# Patient Record
Sex: FEMALE | Race: BLACK OR AFRICAN AMERICAN
[De-identification: names, ages, dates, MRNs, and addresses within clinical notes are randomized per-mention and may not be internally consistent; named-entity substitution may affect disease eponyms.]

---

## 2019-03-28 ENCOUNTER — HOSPITAL ENCOUNTER (EMERGENCY)
Dept: HOSPITAL 92 - ERS | Age: 24
Discharge: HOME | End: 2019-03-28
Payer: SELF-PAY

## 2019-03-28 DIAGNOSIS — O99.341: ICD-10-CM

## 2019-03-28 DIAGNOSIS — Z3A.01: ICD-10-CM

## 2019-03-28 DIAGNOSIS — O99.011: ICD-10-CM

## 2019-03-28 DIAGNOSIS — O20.0: Primary | ICD-10-CM

## 2019-03-28 LAB
BACTERIA UR QL AUTO: (no result) HPF
BASOPHILS # BLD AUTO: 0.1 THOU/UL (ref 0–0.2)
BASOPHILS NFR BLD AUTO: 0.8 % (ref 0–1)
CRYSTAL-AUWI FLAG: 0.5 (ref 0–15)
EOSINOPHIL # BLD AUTO: 0.1 THOU/UL (ref 0–0.7)
EOSINOPHIL NFR BLD AUTO: 0.6 % (ref 0–10)
HCG SERPL QL: POSITIVE
HEV IGM SER QL: 1.3 (ref 0–7.99)
HGB BLD-MCNC: 13.7 G/DL (ref 12–16)
HYALINE CASTS #/AREA URNS LPF: (no result) LPF
LYMPHOCYTES # BLD: 2.2 THOU/UL (ref 1.2–3.4)
LYMPHOCYTES NFR BLD AUTO: 21.8 % (ref 21–51)
MCH RBC QN AUTO: 31.7 PG (ref 27–31)
MCV RBC AUTO: 93.3 FL (ref 78–98)
MONOCYTES # BLD AUTO: 0.7 THOU/UL (ref 0.11–0.59)
MONOCYTES NFR BLD AUTO: 6.8 % (ref 0–10)
NEUTROPHILS # BLD AUTO: 7 THOU/UL (ref 1.4–6.5)
NEUTROPHILS NFR BLD AUTO: 70 % (ref 42–75)
PATHC CAST-AUWI FLAG: 0.54 (ref 0–2.49)
PLATELET # BLD AUTO: 247 THOU/UL (ref 130–400)
PREGS CONTROL BACKGROUND?: (no result)
PREGS CONTROL BAR APPEAR?: YES
RBC # BLD AUTO: 4.31 MILL/UL (ref 4.2–5.4)
SP GR UR STRIP: 1.02 (ref 1–1.04)
SPERM-AUWI FLAG: 0 (ref 0–9.9)
WBC # BLD AUTO: 10 THOU/UL (ref 4.8–10.8)
WBC UR QL AUTO: (no result) HPF (ref 0–3)
YEAST-AUWI FLAG: 0 (ref 0–25)

## 2019-03-28 PROCEDURE — 81015 MICROSCOPIC EXAM OF URINE: CPT

## 2019-03-28 PROCEDURE — 85025 COMPLETE CBC W/AUTO DIFF WBC: CPT

## 2019-03-28 PROCEDURE — 36415 COLL VENOUS BLD VENIPUNCTURE: CPT

## 2019-03-28 PROCEDURE — 84702 CHORIONIC GONADOTROPIN TEST: CPT

## 2019-03-28 PROCEDURE — 76856 US EXAM PELVIC COMPLETE: CPT

## 2019-03-28 PROCEDURE — 84703 CHORIONIC GONADOTROPIN ASSAY: CPT

## 2019-03-28 PROCEDURE — 86901 BLOOD TYPING SEROLOGIC RH(D): CPT

## 2019-03-28 PROCEDURE — 86900 BLOOD TYPING SEROLOGIC ABO: CPT

## 2019-03-28 PROCEDURE — 87086 URINE CULTURE/COLONY COUNT: CPT

## 2019-03-28 PROCEDURE — 81003 URINALYSIS AUTO W/O SCOPE: CPT

## 2019-03-28 NOTE — ULT
PELVIC ULTRASOUND INCLUDING TRANSABDOMINAL AND TRANSVAGINAL AND VASCULAR DUPLEX WITH COLOR AND SPECTR
AL DOPPLER IMAGING:

 

Date;  03/28/19 

 

HISTORY:  

Back pain. Positive pregnancy test. 

 

FINDINGS:

The uterus measures 9.0 x 4.6 x 5.0 cm. The right ovary measures 2.8 x 3.9 x 4.0 cm. The left ovary i
s not visualized. 1.0 x 1.5 x 1.6 cm diameter right ovarian cyst. Intrauterine gestational sac measur
es approximately 1.2 cm, equivalent to 6 weeks/0 days gestation. Crown-rump length was probably prese
nt, although no fetal heart tones were noted. There is a small amount of sonolucency adjacent to the 
gestational sac, possibly a small retrochorionic hemorrhage. No abnormal cul-de-sac fluid. 

 

IMPRESSION: 

Intrauterine gestational sac with a yolk sac, but no evidence for fetal heartbeat. Probable small ext
rachorionic hemorrhage. 

 

Recommend follow-up serum HCGs and consider follow-up ultrasound if there is concern for early fetal 
demise. 

 

 

 

POS: [facility]

## 2019-07-26 ENCOUNTER — HOSPITAL ENCOUNTER (OUTPATIENT)
Dept: HOSPITAL 92 - BICULT | Age: 24
Discharge: HOME | End: 2019-07-26
Payer: MEDICAID

## 2019-07-26 DIAGNOSIS — Z34.82: Primary | ICD-10-CM

## 2019-07-26 DIAGNOSIS — Z3A.23: ICD-10-CM

## 2019-07-26 PROCEDURE — 76805 OB US >/= 14 WKS SNGL FETUS: CPT

## 2019-07-26 NOTE — ULT
ULTRASOUND OBSTETRICAL COMPLETE:

7/26/19

 

HISTORY:

ICD-10: Z34.82, encounter for supervision of other normal pregnancy in second trimester. Complete 
anatomy, sizes and dates, and cervical length. 23-year-old pregnant female. 

 

FINDINGS:

 

Fetal number: Michelle.

Fetal lie: Cephalic.

Maternal cervix: 3.5 cm in length and closed. 

Placenta: Posterior. No placenta previa. 

Amniotic fluid volume: YEYO 11 cm.

Fetal heart rate: 139 bpm

 

The following fetal anatomy is visualized, with no evidence of anomalies:

 

Head, lateral ventricles, cerebellum, nose and lips, spine, upper limbs, lower limbs, four chamber he
art, umbilical cord, cord insertion, stomach, kidneys, and bladder.

 

Fetal biometry:

 

Head circumference (HC):       21.3 cm      23w      3d

Biparietal diameter (BPD):      5.7 cm      23w      4d

Abdominal circumference (AC):      18.1 cm      23w      0d

Femur length (FL):            4.0 cm      23w      0d

 

Average ultrasound age (AUA):                  23w      2d

Estimated date of delivery (ROSALIE): 11/20/2019

 

Last menstrual period (LMP): 02/14/2019

Gestational age by LMP: 23w      1d

 

Estimated fetal weight (EFW): 556 g +/- 81 g (1 lb. 4 oz +/- 3 oz.).

 

IMPRESSION:

1. Live second trimester intrauterine gestation.

2. Estimated gestational age of 23 weeks, 1 day.

3. Cephalic lie.

4. No fetal anatomical abnormalities.

 

 

jn []

 

POS: [facility]

## 2019-09-07 ENCOUNTER — HOSPITAL ENCOUNTER (OUTPATIENT)
Dept: HOSPITAL 92 - SDC | Age: 24
Discharge: HOME | End: 2019-09-07
Attending: OBSTETRICS & GYNECOLOGY
Payer: MEDICAID

## 2019-09-07 VITALS — BODY MASS INDEX: 25.7 KG/M2

## 2019-09-07 DIAGNOSIS — O99.89: Primary | ICD-10-CM

## 2019-09-07 DIAGNOSIS — R51: ICD-10-CM

## 2019-09-07 DIAGNOSIS — Z3A.29: ICD-10-CM

## 2019-09-07 DIAGNOSIS — R10.30: ICD-10-CM

## 2019-09-07 LAB
BACTERIA UR QL AUTO: (no result) HPF
FFN INTERNAL QC ANALYZER: (no result)
FFN INTERNAL QC CASSETTE: (no result)
RBC UR QL AUTO: (no result) HPF (ref 0–3)
WBC UR QL AUTO: (no result) HPF (ref 0–3)

## 2019-09-07 PROCEDURE — 99285 EMERGENCY DEPT VISIT HI MDM: CPT

## 2019-09-07 PROCEDURE — 81001 URINALYSIS AUTO W/SCOPE: CPT

## 2019-09-07 PROCEDURE — 36415 COLL VENOUS BLD VENIPUNCTURE: CPT

## 2019-09-07 PROCEDURE — 96361 HYDRATE IV INFUSION ADD-ON: CPT

## 2019-09-07 PROCEDURE — 82731 ASSAY OF FETAL FIBRONECTIN: CPT

## 2019-09-07 PROCEDURE — 96360 HYDRATION IV INFUSION INIT: CPT

## 2019-09-07 NOTE — PDOC.FPROB
FMR OB H&P: HPI





- History of Present Illness


Chief Complaint: abdominal pain


History of Present Illness: 





23 yo  @ 29.3wks here complaining of abdominal pain since 0200. Pain is 

sharp and comes and goes. Denies vaginal bleeding, LOF, dysuria, discharge. 

Endorses fetal movement.





FMR OB H&P: Current Pregnancy





- Prenatal Care


: 4


Para: 1021


Gestational age: 29.3





FMR OB H&P: History





- Past Medical History


PMH: 





none





- OB History


OB History: 





one prior 


2 SABs 1T





- Surgical History


Sx History: 





denies





- Social History


Social History: 


denies 








- Family History


Family History: 





denies





FMR OB H&P: ROS





- Review of Systems


General: denies: fever/chills, weight/appetite/sleep changes


ENT: denies: nasal congestion, rhinorrhea


Cardiovascular: denies: chest pain, edema


Respiratory: denies: cough, congestion, shortness of breath


Gastrointestinal: reports: abdominal pain.  denies: nausea, vomiting, diarrhea


Genitourinary (Female): reports: contractions.  denies: incontinence, dysuria, 

vaginal discharge, vaginal pain, vaginal bleeding, vaginal pressure


Musculoskeletal: denies: pain, stiffness


Neurologic: denies: numbness, syncope


Integumentary: denies: itching, rash


Psychological: denies: depression, anxiety





FMR OB H&P: Vital Signs





- Fetal Heart Tones


Baseline: 140


Variability: moderate


Acceleration: absent


Deceleration: absent


[place] contractions every: irritability





FMR OB H&P: Physical Exam





- Physical Exam


General: NAD, awake, alert and oriented


HEENT: normocephalic and atraumatic, PERRLA


Heart: RRR, normal S1/S2, no murmurs/rubs/gallops


General: CTAB, no respiratory distress, no rales/rhonchi, no wheezing


Abdomen: soft, gravid, other (no CVA tenderness; suprapubic tenderness)


Skin: no rash, capillary refill <2 seconds


Lymphatic: no unusual bruising or bleeding, no purpura


Psychiatric: intact recent and remote memory





- Pelvic Exam


SVE: closed thick high





FMR OB H&P: A/P





- Problem List


(1) Third trimester pregnancy


Current Visit: Yes   Status: Acute   Code(s): Z34.93 - ENCNTR FOR SUPRVSN OF 

NORMAL PREG, UNSP, THIRD TRIMESTER   





(2) Abdominal pain


Current Visit: Yes   Status: Acute   Code(s): R10.9 - UNSPECIFIED ABDOMINAL 

PAIN   


Discussion: 


Date/Time: 19 1404








23 yo  @29.3wks here with abdominal pain-





#sIUP with abdominal pain and a headache


-r/o  labor, cervix is closed thick and high, ordered FFN, uterine 

irritability seen but no contractions


-UA and culture to rule out infection, no recent sexual intercourse or hx of 

STIs, no new partners


-will obs in L&D


-tylenol and po hydration provided





H. MD Cyndy, PGY-3





Pt seen and plan discussed with Dr. Cat





Addendum - Attending





- Attending Attestation


Date/Time: 19 9673





I personally evaluated the patient and discussed the management with Dr. Caballero.


I agree with the History, Examination, Assessment and Plan documented above.

## 2019-11-20 ENCOUNTER — HOSPITAL ENCOUNTER (INPATIENT)
Dept: HOSPITAL 92 - L&D/OP | Age: 24
LOS: 1 days | Discharge: HOME | End: 2019-11-21
Attending: OBSTETRICS & GYNECOLOGY | Admitting: OBSTETRICS & GYNECOLOGY
Payer: SELF-PAY

## 2019-11-20 VITALS — BODY MASS INDEX: 29.7 KG/M2

## 2019-11-20 DIAGNOSIS — Z3A.40: ICD-10-CM

## 2019-11-20 LAB
HBSAG INDEX: 0.13 S/CO (ref 0–0.99)
HGB BLD-MCNC: 11.4 G/DL (ref 12–16)
MCH RBC QN AUTO: 29.5 PG (ref 27–31)
MCV RBC AUTO: 86.2 FL (ref 78–98)
PLATELET # BLD AUTO: 206 THOU/UL (ref 130–400)
RBC # BLD AUTO: 3.87 MILL/UL (ref 4.2–5.4)
SYPHILIS ANTIBODY INDEX: 0.05 S/CO
WBC # BLD AUTO: 11.1 THOU/UL (ref 4.8–10.8)

## 2019-11-20 PROCEDURE — 85027 COMPLETE CBC AUTOMATED: CPT

## 2019-11-20 PROCEDURE — 36415 COLL VENOUS BLD VENIPUNCTURE: CPT

## 2019-11-20 PROCEDURE — 86780 TREPONEMA PALLIDUM: CPT

## 2019-11-20 PROCEDURE — 99285 EMERGENCY DEPT VISIT HI MDM: CPT

## 2019-11-20 PROCEDURE — 86901 BLOOD TYPING SEROLOGIC RH(D): CPT

## 2019-11-20 PROCEDURE — 87340 HEPATITIS B SURFACE AG IA: CPT

## 2019-11-20 PROCEDURE — 86850 RBC ANTIBODY SCREEN: CPT

## 2019-11-20 PROCEDURE — 10907ZC DRAINAGE OF AMNIOTIC FLUID, THERAPEUTIC FROM PRODUCTS OF CONCEPTION, VIA NATURAL OR ARTIFICIAL OPENING: ICD-10-PCS | Performed by: OBSTETRICS & GYNECOLOGY

## 2019-11-20 PROCEDURE — 86900 BLOOD TYPING SEROLOGIC ABO: CPT

## 2019-11-20 RX ADMIN — HYDROCODONE BITARTRATE AND ACETAMINOPHEN PRN TAB: 5; 325 TABLET ORAL at 15:54

## 2019-11-20 RX ADMIN — DOCUSATE CALCIUM SCH MG: 240 CAPSULE, LIQUID FILLED ORAL at 21:51

## 2019-11-20 RX ADMIN — HYDROCODONE BITARTRATE AND ACETAMINOPHEN PRN TAB: 5; 325 TABLET ORAL at 09:00

## 2019-11-20 RX ADMIN — DOCUSATE CALCIUM SCH: 240 CAPSULE, LIQUID FILLED ORAL at 14:55

## 2019-11-20 NOTE — PDOC.LDHP
Labor and Delivery H&P


Chief complaint: contractions


HPI: 


24 yo LAF c/o UCs since earlier this PM.


Denies SROM.


Current gestational age (weeks): 38


Due date: 19


Dating criteria: last menstrual period


Grav: 2


Para: 1


OB History Details: 


PNC with Dr. Velázquez w/o complications.


Current pregnancy complications: none


Abnormal US findings: No


Current medications: pre-kandice vitamins


Previous surgical history: none


Allergies/Adverse Reactions: 


 Allergies











Allergy/AdvReac Type Severity Reaction Status Date / Time


 


No Known Allergies Allergy   Verified 19 01:26











Social history: none





- Physical Exam


Vital signs reviewed and normal: yes


General: breathing through contractions


Heart: RRR


Lungs: CTAB


Abdomen: gravid


Extremeties: trace edema


FHT: category 1


[place] contractions every: Ucs q 3-5 mins.





- Vaginal Exam


cm dilated: 7


Effacement: 90%


Station: 1+





- OB Labs


GBS: negative





- Assessment


L&D Assessment: term patient in labor





- Plan


Plan: admit to L&D, informed consent obtained (L&D has notified  of 

admit.)

## 2019-11-21 VITALS — SYSTOLIC BLOOD PRESSURE: 106 MMHG | DIASTOLIC BLOOD PRESSURE: 58 MMHG

## 2019-11-21 VITALS — TEMPERATURE: 98 F

## 2019-11-21 LAB
HGB BLD-MCNC: 10.2 G/DL (ref 12–16)
MCH RBC QN AUTO: 29.7 PG (ref 27–31)
MCV RBC AUTO: 87.6 FL (ref 78–98)
PLATELET # BLD AUTO: 178 THOU/UL (ref 130–400)
RBC # BLD AUTO: 3.42 MILL/UL (ref 4.2–5.4)
WBC # BLD AUTO: 11.8 THOU/UL (ref 4.8–10.8)

## 2019-11-21 RX ADMIN — DOCUSATE CALCIUM SCH MG: 240 CAPSULE, LIQUID FILLED ORAL at 08:47

## 2025-02-27 ENCOUNTER — HOSPITAL ENCOUNTER (EMERGENCY)
Dept: HOSPITAL 92 - ERS | Age: 30
Discharge: HOME | End: 2025-02-27
Payer: SELF-PAY

## 2025-02-27 DIAGNOSIS — R11.0: Primary | ICD-10-CM

## 2025-02-27 LAB
ALBUMIN SERPL BCG-MCNC: 4.5 G/DL (ref 3.1–4.5)
ALP SERPL-CCNC: 91 U/L (ref 40–110)
ALT SERPL W P-5'-P-CCNC: 9 U/L (ref ?–34)
ANION GAP SERPL CALC-SCNC: 14 MMOL/L (ref 10–20)
AST SERPL-CCNC: 14 U/L (ref 11–34)
BASOPHILS # BLD AUTO: 0.04 10X3/UL (ref 0–0.2)
BASOPHILS NFR BLD AUTO: 0.4 % (ref 0–1)
BILIRUB SERPL-MCNC: 1 MG/DL (ref 0.3–1.2)
BUN SERPL-MCNC: 12 MG/DL (ref 7–18.7)
CALCIUM SERPL-MCNC: 9.9 MG/DL (ref 7.8–10.44)
CHLORIDE SERPL-SCNC: 107 MMOL/L (ref 98–107)
CO2 SERPL-SCNC: 23 MMOL/L (ref 22–29)
CREAT CL PREDICTED SERPL C-G-VRATE: 0 ML/MIN (ref 70–130)
EOSINOPHIL # BLD AUTO: 0.1 10X3/UL (ref 0–0.7)
EOSINOPHIL NFR BLD AUTO: 0.5 % (ref 0–10)
GLOBULIN SER CALC-MCNC: 3.7 G/DL (ref 2.4–3.5)
GLUCOSE SERPL-MCNC: 132 MG/DL (ref 70–105)
HCT VFR BLD CALC: 39.3 % (ref 36–47)
HGB BLD-MCNC: 13.3 G/DL (ref 12–16)
LIPASE SERPL-CCNC: 28 U/L (ref 8–78)
LYMPHOCYTES NFR BLD AUTO: 13.1 % (ref 21–51)
MCH RBC QN AUTO: 30.5 PG (ref 27–31)
MCV RBC AUTO: 90.1 FL (ref 78–98)
MONOCYTES # BLD AUTO: 0.4 10X3/UL (ref 0.11–0.59)
MONOCYTES NFR BLD AUTO: 4.3 % (ref 0–10)
NEUTROPHILS # BLD AUTO: 8.1 10X3/UL (ref 1.4–6.5)
NEUTROPHILS NFR BLD AUTO: 81.3 % (ref 42–75)
PLATELET # BLD AUTO: 291 10X3/UL (ref 130–400)
POTASSIUM SERPL-SCNC: 3.8 MMOL/L (ref 3.5–5.1)
PREGS CONTROL BACKGROUND?: (no result)
PREGS CONTROL BAR APPEAR?: YES
RBC # BLD AUTO: 4.36 MILL/UL (ref 4.2–5.4)
RBC UR QL AUTO: (no result) HPF (ref 0–3)
SODIUM SERPL-SCNC: 140 MMOL/L (ref 136–145)
SP GR UR STRIP: 1 (ref 1–1.04)
WBC # BLD AUTO: 10 10X3/UL (ref 4.8–10.8)
WBC UR QL AUTO: (no result) HPF (ref 0–3)

## 2025-02-27 PROCEDURE — 83690 ASSAY OF LIPASE: CPT

## 2025-02-27 PROCEDURE — 87086 URINE CULTURE/COLONY COUNT: CPT

## 2025-02-27 PROCEDURE — 80053 COMPREHEN METABOLIC PANEL: CPT

## 2025-02-27 PROCEDURE — 84703 CHORIONIC GONADOTROPIN ASSAY: CPT

## 2025-02-27 PROCEDURE — 81001 URINALYSIS AUTO W/SCOPE: CPT

## 2025-02-27 PROCEDURE — 85025 COMPLETE CBC W/AUTO DIFF WBC: CPT

## 2025-02-27 PROCEDURE — 84702 CHORIONIC GONADOTROPIN TEST: CPT

## 2025-02-27 PROCEDURE — 36415 COLL VENOUS BLD VENIPUNCTURE: CPT

## 2025-02-27 PROCEDURE — 99283 EMERGENCY DEPT VISIT LOW MDM: CPT
